# Patient Record
Sex: MALE | Race: WHITE | NOT HISPANIC OR LATINO | Employment: STUDENT | URBAN - METROPOLITAN AREA
[De-identification: names, ages, dates, MRNs, and addresses within clinical notes are randomized per-mention and may not be internally consistent; named-entity substitution may affect disease eponyms.]

---

## 2017-10-31 ENCOUNTER — OFFICE VISIT (OUTPATIENT)
Dept: UROLOGY | Facility: CLINIC | Age: 22
End: 2017-10-31
Payer: COMMERCIAL

## 2017-10-31 VITALS — BODY MASS INDEX: 39.17 KG/M2 | WEIGHT: 315 LBS | HEIGHT: 75 IN

## 2017-10-31 DIAGNOSIS — R10.9 FLANK PAIN: ICD-10-CM

## 2017-10-31 DIAGNOSIS — N50.819 ORCHALGIA: Primary | ICD-10-CM

## 2017-10-31 LAB
BILIRUB UR QL STRIP: NEGATIVE
CLARITY UR REFRACT.AUTO: CLEAR
COLOR UR AUTO: YELLOW
GLUCOSE UR QL STRIP: NEGATIVE
HGB UR QL STRIP: NEGATIVE
KETONES UR QL STRIP: NEGATIVE
LEUKOCYTE ESTERASE UR QL STRIP: NEGATIVE
NITRITE UR QL STRIP: NEGATIVE
PH UR STRIP: 6 [PH] (ref 5–8)
PROT UR QL STRIP: NEGATIVE
SP GR UR STRIP: 1.01 (ref 1–1.03)
URN SPEC COLLECT METH UR: NORMAL
UROBILINOGEN UR STRIP-ACNC: NEGATIVE EU/DL

## 2017-10-31 PROCEDURE — 99203 OFFICE O/P NEW LOW 30 MIN: CPT | Mod: S$GLB,,, | Performed by: NURSE PRACTITIONER

## 2017-10-31 PROCEDURE — 99999 PR PBB SHADOW E&M-NEW PATIENT-LVL IV: CPT | Mod: PBBFAC,,, | Performed by: NURSE PRACTITIONER

## 2017-10-31 PROCEDURE — 87086 URINE CULTURE/COLONY COUNT: CPT

## 2017-10-31 PROCEDURE — 81003 URINALYSIS AUTO W/O SCOPE: CPT

## 2017-10-31 RX ORDER — SULFAMETHOXAZOLE AND TRIMETHOPRIM 800; 160 MG/1; MG/1
1 TABLET ORAL 2 TIMES DAILY
Qty: 28 TABLET | Refills: 0 | Status: SHIPPED | OUTPATIENT
Start: 2017-10-31 | End: 2017-11-14

## 2017-10-31 NOTE — PATIENT INSTRUCTIONS
Sign-up for patient portal.  Urinalysis  Urine culture  Follow-up in 1 month if symptoms worsen or fail to improve.

## 2017-10-31 NOTE — PROGRESS NOTES
Subjective:       Patient ID: Rosa Maria Baltazar is a 22 y.o. male.    Chief Complaint: Other (penile discharge)    22 year old WM with c/o left scrotal pain. No pain today. Worse pain a 6/10 on pain scale to left scrotal/testicle when it occurs.       Pain   This is a new problem. Episode onset: 1 week ago. The problem occurs intermittently. The problem has been gradually improving. Associated symptoms include abdominal pain (LLQ pain) and urinary symptoms (frequency ). Pertinent negatives include no change in bowel habit, chills, fatigue, fever, nausea or vomiting. Nothing aggravates the symptoms. He has tried nothing for the symptoms.     Review of Systems   Constitutional: Negative for chills, fatigue and fever.   Gastrointestinal: Positive for abdominal pain (LLQ pain). Negative for change in bowel habit, constipation, diarrhea, nausea and vomiting.   Endocrine: Negative.    Genitourinary: Positive for discharge (only with straining for BM), frequency and testicular pain. Negative for difficulty urinating, dysuria, flank pain, hematuria, penile pain and scrotal swelling.   Musculoskeletal: Negative.        Objective:      Physical Exam   Constitutional: He is oriented to person, place, and time. He appears well-developed and well-nourished. No distress.   HENT:   Head: Normocephalic and atraumatic.   Eyes: EOM are normal. Pupils are equal, round, and reactive to light.   Neck: Normal range of motion. Neck supple.   Cardiovascular: Normal rate.    Pulmonary/Chest: Effort normal. No respiratory distress.   Abdominal: Soft. Bowel sounds are normal. There is no tenderness. Hernia confirmed negative in the right inguinal area and confirmed negative in the left inguinal area.   Genitourinary: Penis normal. Right testis shows no mass, no swelling and no tenderness. Left testis shows swelling. Left testis shows no mass and no tenderness. Circumcised.   Musculoskeletal: Normal range of motion.   Lymphadenopathy: No  inguinal adenopathy noted on the right or left side.   Neurological: He is alert and oriented to person, place, and time. Coordination normal.   Skin: Skin is warm and dry.   Psychiatric: He has a normal mood and affect. His behavior is normal. Judgment and thought content normal.   Nursing note and vitals reviewed.      Assessment:       1. Orchalgia    2. Flank pain        Plan:       Rosa Maria was seen today for other.    Diagnoses and all orders for this visit:    Orchalgia  -     Urinalysis  -     Urine culture  -     X-Ray Abdomen AP 1 View; Future  -     US Retroperitoneal Complete; Future  -     sulfamethoxazole-trimethoprim 800-160mg (BACTRIM DS) 800-160 mg Tab; Take 1 tablet by mouth 2 (two) times daily.  -     US Scrotum And Testicles; Future    Flank pain    Follow-up in 1 month if symptoms worsen or fail to improve.     Rocky Chapin, NP

## 2017-11-01 LAB — BACTERIA UR CULT: NO GROWTH

## 2018-04-17 ENCOUNTER — TELEPHONE (OUTPATIENT)
Dept: FAMILY MEDICINE | Facility: CLINIC | Age: 23
End: 2018-04-17

## 2018-04-17 RX ORDER — GABAPENTIN 100 MG/1
CAPSULE ORAL
Qty: 90 CAPSULE | Refills: 0 | Status: ON HOLD | OUTPATIENT
Start: 2018-04-17 | End: 2018-11-09 | Stop reason: CLARIF

## 2018-04-17 RX ORDER — TIZANIDINE 2 MG/1
TABLET ORAL
Qty: 25 TABLET | Refills: 0 | Status: ON HOLD | OUTPATIENT
Start: 2018-04-17 | End: 2018-11-09 | Stop reason: CLARIF

## 2018-04-17 RX ORDER — PREDNISONE 20 MG/1
TABLET ORAL
Qty: 10 TABLET | Refills: 0 | Status: SHIPPED | OUTPATIENT
Start: 2018-04-17 | End: 2018-11-08

## 2018-04-17 NOTE — TELEPHONE ENCOUNTER
Called and having more back pain  Hx of multiple disc disease  Having more pain  Had injections multiple times    Will call meds for temporary relief

## 2018-06-28 ENCOUNTER — TELEPHONE (OUTPATIENT)
Dept: GASTROENTEROLOGY | Facility: CLINIC | Age: 23
End: 2018-06-28

## 2018-06-28 NOTE — TELEPHONE ENCOUNTER
----- Message from Edie Mccarty sent at 6/28/2018  9:56 AM CDT -----  Patient's mother, Della, called.   No. [phone]   New Patient has blood in his stool and has abdominal pain.   He needs to be seen this week.   Please call.

## 2018-07-03 ENCOUNTER — LAB VISIT (OUTPATIENT)
Dept: LAB | Facility: HOSPITAL | Age: 23
End: 2018-07-03
Attending: INTERNAL MEDICINE
Payer: COMMERCIAL

## 2018-07-03 ENCOUNTER — OFFICE VISIT (OUTPATIENT)
Dept: GASTROENTEROLOGY | Facility: CLINIC | Age: 23
End: 2018-07-03
Payer: COMMERCIAL

## 2018-07-03 VITALS
BODY MASS INDEX: 39.17 KG/M2 | HEART RATE: 96 BPM | HEIGHT: 75 IN | WEIGHT: 315 LBS | SYSTOLIC BLOOD PRESSURE: 159 MMHG | DIASTOLIC BLOOD PRESSURE: 88 MMHG

## 2018-07-03 DIAGNOSIS — R10.32 LLQ PAIN: ICD-10-CM

## 2018-07-03 DIAGNOSIS — R10.32 LLQ PAIN: Primary | ICD-10-CM

## 2018-07-03 DIAGNOSIS — K62.5 RECTAL BLEEDING: ICD-10-CM

## 2018-07-03 LAB
ALBUMIN SERPL BCP-MCNC: 4.5 G/DL
ALP SERPL-CCNC: 78 U/L
ALT SERPL W/O P-5'-P-CCNC: 59 U/L
ANION GAP SERPL CALC-SCNC: 9 MMOL/L
AST SERPL-CCNC: 33 U/L
BASOPHILS # BLD AUTO: 0.02 K/UL
BASOPHILS NFR BLD: 0.1 %
BILIRUB SERPL-MCNC: 1 MG/DL
BUN SERPL-MCNC: 15 MG/DL
CALCIUM SERPL-MCNC: 10.1 MG/DL
CHLORIDE SERPL-SCNC: 102 MMOL/L
CO2 SERPL-SCNC: 25 MMOL/L
CREAT SERPL-MCNC: 0.8 MG/DL
DIFFERENTIAL METHOD: ABNORMAL
EOSINOPHIL # BLD AUTO: 0.1 K/UL
EOSINOPHIL NFR BLD: 0.6 %
ERYTHROCYTE [DISTWIDTH] IN BLOOD BY AUTOMATED COUNT: 12.6 %
EST. GFR  (AFRICAN AMERICAN): >60 ML/MIN/1.73 M^2
EST. GFR  (NON AFRICAN AMERICAN): >60 ML/MIN/1.73 M^2
GLUCOSE SERPL-MCNC: 96 MG/DL
HCT VFR BLD AUTO: 49.8 %
HGB BLD-MCNC: 16.8 G/DL
LYMPHOCYTES # BLD AUTO: 3.2 K/UL
LYMPHOCYTES NFR BLD: 22.3 %
MCH RBC QN AUTO: 27.3 PG
MCHC RBC AUTO-ENTMCNC: 33.7 G/DL
MCV RBC AUTO: 81 FL
MONOCYTES # BLD AUTO: 1.3 K/UL
MONOCYTES NFR BLD: 9.4 %
NEUTROPHILS # BLD AUTO: 9.6 K/UL
NEUTROPHILS NFR BLD: 67.2 %
PLATELET # BLD AUTO: 277 K/UL
PMV BLD AUTO: 10.2 FL
POTASSIUM SERPL-SCNC: 3.6 MMOL/L
PROT SERPL-MCNC: 7.9 G/DL
RBC # BLD AUTO: 6.16 M/UL
SODIUM SERPL-SCNC: 136 MMOL/L
WBC # BLD AUTO: 14.27 K/UL

## 2018-07-03 PROCEDURE — 80053 COMPREHEN METABOLIC PANEL: CPT

## 2018-07-03 PROCEDURE — 36415 COLL VENOUS BLD VENIPUNCTURE: CPT

## 2018-07-03 PROCEDURE — 99204 OFFICE O/P NEW MOD 45 MIN: CPT | Mod: S$GLB,,, | Performed by: INTERNAL MEDICINE

## 2018-07-03 PROCEDURE — 85025 COMPLETE CBC W/AUTO DIFF WBC: CPT

## 2018-07-03 PROCEDURE — 99999 PR PBB SHADOW E&M-EST. PATIENT-LVL III: CPT | Mod: PBBFAC,,, | Performed by: INTERNAL MEDICINE

## 2018-07-03 RX ORDER — POLYETHYLENE GLYCOL 3350 17 G/17G
17 POWDER, FOR SOLUTION ORAL DAILY
Qty: 1 BOTTLE | Refills: 11 | Status: ON HOLD | OUTPATIENT
Start: 2018-07-03 | End: 2018-11-09 | Stop reason: CLARIF

## 2018-07-03 RX ORDER — DICYCLOMINE HYDROCHLORIDE 20 MG/1
20 TABLET ORAL 3 TIMES DAILY PRN
Qty: 60 TABLET | Refills: 2 | Status: ON HOLD | OUTPATIENT
Start: 2018-07-03 | End: 2018-11-09 | Stop reason: CLARIF

## 2018-07-03 NOTE — PROGRESS NOTES
"Subjective:       Patient ID: Rosa Maria Baltazar is a 22 y.o. male.    Chief Complaint: Rectal Bleeding (3 weeks ) and Abdominal Pain (3 weeks )    21 yo M complains of 3 week h/o LLQ pain and rectal bleeding.  Pt had rectal bleeding in college and had EGD/cscope at [place], and states he was dx with hemorrhoids and PUD.  He states that the LLQ pain is mild and intermittent and described more as a "nagging pain."  He continues to have his 3 BM per day without change in consistency or frequency of BM.  He denies F/C, N/V, dysuria, hematuria, or scrotal/testicular issue.  He states he has always had issues with straining for BM.      Review of Systems   Constitutional: Negative for appetite change and unexpected weight change.   Eyes: Negative for photophobia and visual disturbance.   Respiratory: Negative for chest tightness, shortness of breath and wheezing.    Cardiovascular: Negative for chest pain, palpitations and leg swelling.   Genitourinary: Negative for dysuria, flank pain and hematuria.   Musculoskeletal: Negative for joint swelling and myalgias.   Skin: Negative for color change and rash.   Neurological: Negative for dizziness and speech difficulty.   Psychiatric/Behavioral: Negative for confusion and hallucinations.       Objective:      Physical Exam   Constitutional: He is oriented to person, place, and time. He appears well-developed and well-nourished.   HENT:   Head: Normocephalic and atraumatic.   Eyes: EOM are normal. Pupils are equal, round, and reactive to light.   Neck: Normal range of motion. Neck supple.   Cardiovascular: Normal rate, regular rhythm, normal heart sounds and intact distal pulses.    Pulmonary/Chest: Effort normal and breath sounds normal.   Abdominal: Soft. Bowel sounds are normal. He exhibits no distension and no mass. There is no tenderness. There is no rebound and no guarding.   Musculoskeletal: He exhibits no edema or deformity.   Neurological: He is alert and oriented to " person, place, and time.   Skin: Skin is warm and dry.   Psychiatric: He has a normal mood and affect. His behavior is normal.       Assessment:       1. LLQ pain    2. Rectal bleeding        Plan:       LLQ pain; Rectal bleeding  -     CBC auto differential; Future; Expected date: 07/03/2018  -     Comprehensive metabolic panel; Future; Expected date: 07/03/2018  -     dicyclomine (BENTYL) 20 mg tablet; Take 1 tablet (20 mg total) by mouth 3 (three) times daily as needed.  Dispense: 60 tablet; Refill: 2  -     polyethylene glycol (GLYCOLAX) 17 gram/dose powder; Take 17 g by mouth once daily.  Dispense: 1 Bottle; Refill: 11  -     MRI Abdomen-Pelvis w w/o Contrast (XPD); Future; Expected date: 07/03/2018.  MRI instead of CT scan due to shellfish allergy  -     I am suspicious that rectal bleeding is hemorrhoidal in nature given previous colonoscopy.  Obtain records from previous EGD/cscope at [place].

## 2018-07-05 ENCOUNTER — TELEPHONE (OUTPATIENT)
Dept: GASTROENTEROLOGY | Facility: CLINIC | Age: 23
End: 2018-07-05

## 2018-07-05 NOTE — TELEPHONE ENCOUNTER
----- Message from Hortencia Oliver MD sent at 7/5/2018 11:22 AM CDT -----  I don't need the enterography, just the MRI pelvis c/s.     ----- Message -----  From: Yamilet Pineda MA  Sent: 7/5/2018  11:16 AM  To: Hortencia Oliver MD    Please advise  ----- Message -----  From: RT Cinthia  Sent: 7/5/2018  10:51 AM  To: Benito BRANTLEY Staff ([place])    Hi...this pt is on our MRI schedule in [place].  I was wondering if the order was suppose to be for an MR enterography or did you really want abd pelvis...Enterography envolves the pt drinking contrast and receiving iv contrast where the other is only iv contrast..both will image the bowel.  Please advise and place new order if necessary    Thanks  RT Marc()

## 2018-07-10 ENCOUNTER — HOSPITAL ENCOUNTER (OUTPATIENT)
Dept: RADIOLOGY | Facility: HOSPITAL | Age: 23
Discharge: HOME | End: 2018-07-10
Attending: INTERNAL MEDICINE
Payer: COMMERCIAL

## 2018-07-10 DIAGNOSIS — K62.5 RECTAL BLEEDING: ICD-10-CM

## 2018-07-10 DIAGNOSIS — R10.32 LLQ PAIN: ICD-10-CM

## 2018-07-10 PROCEDURE — A9585 GADOBUTROL INJECTION: HCPCS | Performed by: INTERNAL MEDICINE

## 2018-07-10 PROCEDURE — 72197 MRI PELVIS W/O & W/DYE: CPT | Mod: 26,,, | Performed by: RADIOLOGY

## 2018-07-10 PROCEDURE — 25500020 PHARM REV CODE 255: Performed by: INTERNAL MEDICINE

## 2018-07-10 PROCEDURE — 72197 MRI PELVIS W/O & W/DYE: CPT | Mod: TC

## 2018-07-10 PROCEDURE — 74183 MRI ABD W/O CNTR FLWD CNTR: CPT | Mod: 26,,, | Performed by: RADIOLOGY

## 2018-07-10 RX ORDER — GADOBUTROL 604.72 MG/ML
10 INJECTION INTRAVENOUS
Status: COMPLETED | OUTPATIENT
Start: 2018-07-10 | End: 2018-07-10

## 2018-07-10 RX ADMIN — GADOBUTROL 10 ML: 604.72 INJECTION INTRAVENOUS at 02:07

## 2018-07-16 ENCOUNTER — TELEPHONE (OUTPATIENT)
Dept: GASTROENTEROLOGY | Facility: CLINIC | Age: 23
End: 2018-07-16

## 2018-07-16 NOTE — TELEPHONE ENCOUNTER
----- Message from Hortencia Oliver MD sent at 7/16/2018  7:48 AM CDT -----  MRI does not show diverticulitis, but WBC were elevated.  Is he still having the pain?  Is it improving or worsening?

## 2018-07-17 ENCOUNTER — TELEPHONE (OUTPATIENT)
Dept: GASTROENTEROLOGY | Facility: CLINIC | Age: 23
End: 2018-07-17

## 2018-07-17 NOTE — TELEPHONE ENCOUNTER
Informed pt of MRI results and WBC labs. Pt stated that his stomach is still bothering him. The dicyclomine is helping a little but not much.       (Medical records received and scanned into media)

## 2018-07-17 NOTE — TELEPHONE ENCOUNTER
----- Message from Masoud Schafer sent at 7/17/2018 10:17 AM CDT -----  Contact: self/[phone]  Patient is returning your call.  Please advise

## 2018-07-18 ENCOUNTER — TELEPHONE (OUTPATIENT)
Dept: GASTROENTEROLOGY | Facility: HOSPITAL | Age: 23
End: 2018-07-18

## 2018-07-18 DIAGNOSIS — R10.32 LLQ PAIN: Primary | ICD-10-CM

## 2018-07-18 RX ORDER — METRONIDAZOLE 500 MG/1
500 TABLET ORAL EVERY 8 HOURS
Qty: 42 TABLET | Refills: 0 | Status: SHIPPED | OUTPATIENT
Start: 2018-07-18 | End: 2018-08-01

## 2018-07-18 RX ORDER — CIPROFLOXACIN 500 MG/1
500 TABLET ORAL EVERY 12 HOURS
Qty: 28 TABLET | Refills: 0 | Status: SHIPPED | OUTPATIENT
Start: 2018-07-18 | End: 2018-08-01

## 2018-07-18 NOTE — TELEPHONE ENCOUNTER
WBC elevated but no diverticulitis on MRI.  It still could be diverticulitis as MRI is not best test (done due to allergy) therefore will do empiric treatment for diverticulitis with cipro/flagyl.  Meds sent via [facility].  Please let pt know.  If pt still with pain 2 weeks after completing ALL of the abx, he will need to come back to clinic.  Ok to continue the bentyl.

## 2018-11-07 ENCOUNTER — OFFICE VISIT (OUTPATIENT)
Dept: GASTROENTEROLOGY | Facility: CLINIC | Age: 23
End: 2018-11-07
Payer: COMMERCIAL

## 2018-11-07 VITALS
DIASTOLIC BLOOD PRESSURE: 101 MMHG | BODY MASS INDEX: 39.17 KG/M2 | HEIGHT: 75 IN | WEIGHT: 315 LBS | SYSTOLIC BLOOD PRESSURE: 151 MMHG

## 2018-11-07 DIAGNOSIS — R19.4 CHANGE IN BOWEL HABIT: ICD-10-CM

## 2018-11-07 DIAGNOSIS — R10.31 ABDOMINAL PAIN, RLQ (RIGHT LOWER QUADRANT): ICD-10-CM

## 2018-11-07 DIAGNOSIS — R10.32 LLQ PAIN: ICD-10-CM

## 2018-11-07 DIAGNOSIS — K62.5 RECTAL BLEEDING: Primary | ICD-10-CM

## 2018-11-07 PROCEDURE — 99214 OFFICE O/P EST MOD 30 MIN: CPT | Mod: S$GLB,,, | Performed by: NURSE PRACTITIONER

## 2018-11-07 PROCEDURE — 3008F BODY MASS INDEX DOCD: CPT | Mod: CPTII,S$GLB,, | Performed by: NURSE PRACTITIONER

## 2018-11-07 PROCEDURE — 99999 PR PBB SHADOW E&M-EST. PATIENT-LVL III: CPT | Mod: PBBFAC,,, | Performed by: NURSE PRACTITIONER

## 2018-11-07 RX ORDER — OMEPRAZOLE 20 MG/1
20 TABLET, DELAYED RELEASE ORAL DAILY
COMMUNITY
End: 2019-11-15

## 2018-11-07 NOTE — PROGRESS NOTES
Subjective:       Patient ID: Rosa Maria Baltazar is a 23 y.o. male.    Chief Complaint: Abdominal Pain and Rectal Bleeding    HPI  Reports LLQ abdominal pain and rectal bleeding beginning in July.  Evaluation at that time with negative MRI and elevated WBC and concern for diverticulitis based on clinical findings.  Antibiotics were prescribed but he did not complete.  He notes that he had continued to have episodic lower abdominal pain that is worse in the LLQ but can also be present in RLQ.  He notes rectal bleeding.  Describes change in bowel habit over the last month with both liquid stools or formed stools that are soft and very narrow.  He reports straining for bowel movements even with soft or small stools.  Denies anal rectal pain.   He had colonoscopy in 2014 with moderate internal hemorrhoids and fissure noted ( in media, performed by Dr. Garcia.  NO biopsies at that time).  He has been given bentyl at some time in the past but does not recall this medication.      Review of Systems   Constitutional: Negative for activity change, appetite change, fatigue and fever.   Respiratory: Negative for shortness of breath.    Cardiovascular: Negative for chest pain.   Gastrointestinal: Positive for abdominal pain, anal bleeding, blood in stool, constipation and diarrhea. Negative for nausea, rectal pain and vomiting.   Skin: Negative.    Neurological: Negative.    Psychiatric/Behavioral: Negative.        Objective:      Physical Exam   Constitutional: He is oriented to person, place, and time. He appears well-developed and well-nourished. No distress.   Eyes: No scleral icterus.   Cardiovascular: Normal rate.   Pulmonary/Chest: Effort normal. No respiratory distress.   Abdominal: Soft. Bowel sounds are normal. He exhibits no distension and no mass. There is no tenderness. There is no guarding.   Genitourinary:   Genitourinary Comments: CARRIE with no palpable mass or other abnormality.  CARRIE difficult due to body habitus.   No blood noted.   Neurological: He is alert and oriented to person, place, and time.   Skin: Skin is warm and dry. He is not diaphoretic.   Psychiatric: He has a normal mood and affect. His behavior is normal. Judgment and thought content normal.   Vitals reviewed.      Assessment:       1. Rectal bleeding    2. LLQ pain    3. Abdominal pain, RLQ (right lower quadrant)    4. Change in bowel habit        Plan:         Rosa Maria was seen today for abdominal pain and rectal bleeding.    Diagnoses and all orders for this visit:    Rectal bleeding  -     CBC auto differential; Future    LLQ pain    Abdominal pain, RLQ (right lower quadrant)    Change in bowel habit    -   High fiber diet  -   Fiber supplement  -   [place] fluid intake   -   Exercise    Will schedule colonoscopy due to painless rectal bleeding, change in bowel habits and abdominal pain, though I suspect symptoms may be IBS.  Will consider further treatment after other pathology is ruled out.

## 2018-11-07 NOTE — PATIENT INSTRUCTIONS
CLENPIQ Instructions    You are scheduled for a colonoscopy with Dr. Oliver on 11-9-18 at [facility]  To ensure that your test is accurate and complete, you MUST follow these instructions listed below.  If you have any questions, please call our office at [phone].  Plan on being at the hospital for your procedure for 3-4 hours.    1.  Follow a CLEAR LIQUID DIET for the entire day before your scheduled colonoscopy.  This means no solid food the entire day starting when you wake.  You may have as much of the clear liquids as you want throughout the day.   CLEAR LIQUID DIET:   - Avoid Red, Orange, Purple, and/or Blue food coloring   - NO DAIRY   - You can have:  Coffee with sugar (no creamer), tea, water, soda, apple or white grape juice, chicken or beef broth/bouillon (no meat, noodles, or veggies), green/yellow popsicles, green/yellow Jell-O, lemonade.    2.  AT 5 pm the evening before your colonoscopy, OPEN ONE (1) BOTTLE OF CLENPIQ AND DRINK THE ENTIRE BOTTLE.  DRINK FIVE (5) 8-OUNCE GLASSES OF WATER (40 OUNCES TOTAL) OVER THE NEXT FIVE (5) HOURS.     3.  The endoscopy department will call you 2 days before your colonoscopy to tell you the exact time to arrive, AND to tell you the exact time to drink the 2nd portion of your prep (which will be FIVE HOURS BEFORE YOUR ARRIVAL TIME).  At this time given to you, OPEN THE OTHER ONE (1) BOTTLE OF CLENPIQ AND DRINK THE ENTIRE BOTTLE.  DRINK THREE (3) 8-OUNCE GLASSES OF WATER (24 OUNCES TOTAL) OVER THE NEXT THREE (3) HOURS. Once this is complete, you can not have anything else by mouth!    4.  You must have someone with you to DRIVE YOU HOME since you will be receiving IV sedation for the colonoscopy.    5.  It is ok to take your heart, blood pressure, and seizure medications in the morning of your test with a SIP of water.  Hold other medications until after your procedure.  Do NOT have anything else to eat or drink the morning of your colonoscopy.   It is ok to brush your teeth.    6.  If you are on blood thinners THAT YOU HAVE BEEN INSTRUCTED TO HOLD BY YOUR DOCTOR FOR THIS PROCEDURE, then do NOT take this the morning of your colonoscopy.  Do NOT stop these medications on your own, they must be approved to be held by your doctor.  Your colonoscopy can NOT be done if you are on these medications.  Examples of blood thinners include: Coumadin, Aggrenox, Plavix, Pradaxa, Reapro, Pletal, Xarelto, Ticagrelor, Brilinta, Eliquis, and high dose aspirin (325 mg).  You do not have to stop baby aspirin 81 mg.    7.  IF YOU ARE DIABETIC:  NO INSULIN OR ORAL MEDICATIONS THE MORNING OF THE COLONOSCOPY.  TAKE ONLY HALF THE DOSE OF YOUR INSULIN THE DAY BEFORE THE COLONOSCOPY.  DO NOT TAKE ANY ORAL DIABETIC MEDICATIONS THE DAY BEFORE THE COLONOSCOPY.  IF YOU ARE AN INSULIN DEPENDENT DIABETIC WITH UNSTABLE BLOOD SUGARDS, NOTIFY YOUR PRIMARY CARE PHYSICIAN FOR INSTRUCTIONS.

## 2018-11-08 ENCOUNTER — TELEPHONE (OUTPATIENT)
Dept: GASTROENTEROLOGY | Facility: CLINIC | Age: 23
End: 2018-11-08

## 2018-11-08 DIAGNOSIS — D72.829 LEUKOCYTOSIS, UNSPECIFIED TYPE: Primary | ICD-10-CM

## 2018-11-08 NOTE — TELEPHONE ENCOUNTER
----- Message from CATRACHO Valdez sent at 11/8/2018 12:35 PM CST -----  Let him know that his white blood cell count remains high as it has been over the last few years and we can make him an appt with hematology to evaluate.  I have placed referral, can schedule with Dr. Leija Or Dr. Degroot in [place]

## 2018-11-08 NOTE — TELEPHONE ENCOUNTER
Informed pt of lab results, and that someone will get in contact with him from the Hematology department to schedule an appt. Vertbal Understanding.

## 2018-11-12 ENCOUNTER — OFFICE VISIT (OUTPATIENT)
Dept: HEMATOLOGY/ONCOLOGY | Facility: CLINIC | Age: 23
End: 2018-11-12
Payer: COMMERCIAL

## 2018-11-12 VITALS
DIASTOLIC BLOOD PRESSURE: 110 MMHG | BODY MASS INDEX: 46.95 KG/M2 | SYSTOLIC BLOOD PRESSURE: 170 MMHG | TEMPERATURE: 97 F | WEIGHT: 315 LBS | HEART RATE: 96 BPM | RESPIRATION RATE: 16 BRPM | OXYGEN SATURATION: 97 %

## 2018-11-12 DIAGNOSIS — K62.5 RECTAL BLEEDING: Primary | ICD-10-CM

## 2018-11-12 DIAGNOSIS — D72.823 LEUKEMOID REACTION: ICD-10-CM

## 2018-11-12 PROBLEM — K58.8 OTHER IRRITABLE BOWEL SYNDROME: Status: ACTIVE | Noted: 2018-11-07

## 2018-11-12 PROCEDURE — 99203 OFFICE O/P NEW LOW 30 MIN: CPT | Mod: S$GLB,,, | Performed by: INTERNAL MEDICINE

## 2018-11-12 PROCEDURE — 99999 PR PBB SHADOW E&M-EST. PATIENT-LVL III: CPT | Mod: PBBFAC,,, | Performed by: INTERNAL MEDICINE

## 2018-11-12 PROCEDURE — 3008F BODY MASS INDEX DOCD: CPT | Mod: CPTII,S$GLB,, | Performed by: INTERNAL MEDICINE

## 2018-11-12 RX ORDER — POLYETHYLENE GLYCOL 3350 17 G/17G
POWDER, FOR SOLUTION ORAL
COMMUNITY
End: 2019-11-15

## 2018-11-12 NOTE — PROGRESS NOTES
Subjective:       Patient ID: Rosa Maria Baltazar is a 23 y.o. male.    Chief Complaint: abnormal labs (wbc elevated)    HPI Referred for evaluation of leucocytosis.    CBC 11/8/18 - showed WBC count of 13.99. Differential count unremarkable. Hb and Platelet count WNL.    He was recently diagnosed with IBS. Has colonic spasm on recent colonoscopy.    Past CBC's reviewed.    He had elevated WBC on a prior CBC in 2016, then normalized in 2017 now mildly elevated again.    Weight stable. Appetite good.    Pt accompanied by his mother.    Review of Systems   Constitutional: Negative for fever and unexpected weight change.   HENT: Negative for mouth sores and nosebleeds.    Eyes: Negative for photophobia and pain.   Respiratory: Negative for shortness of breath and wheezing.    Cardiovascular: Negative for chest pain and palpitations.   Gastrointestinal: Negative for abdominal pain and vomiting.   Musculoskeletal: Negative for neck pain and neck stiffness.   Skin: Negative for rash and wound.   Neurological: Negative for seizures and syncope.   Hematological: Negative for adenopathy. Does not bruise/bleed easily.   Psychiatric/Behavioral: Negative for behavioral problems and confusion.         Objective:      Physical Exam   Constitutional: He is oriented to person, place, and time.  Non-toxic appearance. He does not have a sickly appearance. No distress.   HENT:   Nose: No epistaxis.   Mouth/Throat: Oropharynx is clear and moist. Mucous membranes are not pale, not dry and not cyanotic. No lacerations. No oropharyngeal exudate.   Eyes: Conjunctivae are normal. No scleral icterus.   Neck: Neck supple. No thyroid mass and no thyromegaly present.   Cardiovascular: Normal rate, regular rhythm, S1 normal and normal heart sounds.   Pulmonary/Chest: Effort normal and breath sounds normal. No accessory muscle usage or stridor. No respiratory distress. He has no wheezes. He has no rales.   Abdominal: Soft. He exhibits no ascites and  no mass. There is no hepatosplenomegaly. There is no tenderness.   Musculoskeletal: He exhibits no edema.   Lymphadenopathy:        Head (right side): No submental and no submandibular adenopathy present.        Head (left side): No submental and no submandibular adenopathy present.     He has no cervical adenopathy.     He has no axillary adenopathy.   Neurological: He is alert and oriented to person, place, and time. He has normal strength. No cranial nerve deficit or sensory deficit. Gait normal.   Skin: No bruising, no petechiae and no rash noted. He is not diaphoretic. No cyanosis.   Psychiatric: He has a normal mood and affect. Judgment and thought content normal. Cognition and memory are normal.   Vitals reviewed.        Assessment:       1. Rectal bleeding    2. Leukemoid reaction        Plan:   Based on review on recent CBC with differential, past CBC's and his medical history, his elevated WBC is secondary to a leukemoid reaction from IBS.    Discussed this aspect with pt and his mother.    Provided them with recent and past CBC values.    No further work-up needed. Reassured them.    RTC prn.    Many thanks to Sanjuana Agudelo for the kind referral.

## 2018-11-12 NOTE — LETTER
November 12, 2018      Sanjuana Agudelo, [facility]  [address]  [place] [address]           [facility]  [address] [place] [address]  Phone: [phone]          Patient: Rosa Maria Baltazar   MR Number: [id number]   YOB: 1995   Date of Visit: 11/12/2018       Dear Sanjuana Agudelo:    Thank you for referring Rosa Maria Baltazar to me for evaluation. Attached you will find relevant portions of my assessment and plan of care.    If you have questions, please do not hesitate to call me. I look forward to following Rosa Maria Baltazar along with you.    Sincerely,    Henrry Leija MD    Enclosure  CC:  No Recipients    If you would like to receive this communication electronically, please contact [email or web address] or [phone] to request more information on [employer] access.    For providers and/or their staff who would like to refer a patient to [facility], please contact us through our one-stop-shop provider referral line, Roberth Galleog, at [phone].    If you feel you have received this communication in error or would no longer like to receive these types of communications, please e-mail [email or web address]

## 2018-11-16 ENCOUNTER — TELEPHONE (OUTPATIENT)
Dept: GASTROENTEROLOGY | Facility: CLINIC | Age: 23
End: 2018-11-16

## 2018-11-16 NOTE — TELEPHONE ENCOUNTER
----- Message from Michelle Frances sent at 11/16/2018  2:55 PM CST -----  Contact: self / [phone]  Patient is returning a call from your office. For test results .Please advise

## 2019-04-30 DIAGNOSIS — I10 HTN (HYPERTENSION): Primary | ICD-10-CM

## 2019-04-30 RX ORDER — TRAMADOL HYDROCHLORIDE 50 MG/1
50 TABLET ORAL 4 TIMES DAILY
Qty: 60 TABLET | Refills: 0 | OUTPATIENT
Start: 2019-04-30 | End: 2019-11-15

## 2019-10-18 ENCOUNTER — TELEPHONE (OUTPATIENT)
Dept: GASTROENTEROLOGY | Facility: CLINIC | Age: 24
End: 2019-10-18

## 2019-10-18 NOTE — TELEPHONE ENCOUNTER
----- Message from Osman Feliciano sent at 10/18/2019 11:09 AM CDT -----  Contact: [phone]/ self   Patient requesting to speak with you regarding personal matter. Please call.

## 2019-10-18 NOTE — TELEPHONE ENCOUNTER
Patient states he had blood in his stool last night and again this morning. Patient is not having any pain. Colonoscopy done 11/9/18. Patient had a polyp in the rectum that was removed. Do you want to repeat colonoscopy or have patient return to office?

## 2019-11-15 ENCOUNTER — OFFICE VISIT (OUTPATIENT)
Dept: FAMILY MEDICINE | Facility: CLINIC | Age: 24
End: 2019-11-15
Payer: COMMERCIAL

## 2019-11-15 VITALS
HEIGHT: 75 IN | OXYGEN SATURATION: 98 % | HEART RATE: 83 BPM | SYSTOLIC BLOOD PRESSURE: 152 MMHG | WEIGHT: 315 LBS | BODY MASS INDEX: 39.17 KG/M2 | DIASTOLIC BLOOD PRESSURE: 94 MMHG | TEMPERATURE: 98 F

## 2019-11-15 DIAGNOSIS — L03.113 CELLULITIS OF RIGHT UPPER EXTREMITY: ICD-10-CM

## 2019-11-15 DIAGNOSIS — T63.301A SPIDER BITE WOUND, ACCIDENTAL OR UNINTENTIONAL, INITIAL ENCOUNTER: ICD-10-CM

## 2019-11-15 DIAGNOSIS — I10 HYPERTENSION, ESSENTIAL: ICD-10-CM

## 2019-11-15 DIAGNOSIS — M25.521 RIGHT ELBOW PAIN: Primary | ICD-10-CM

## 2019-11-15 DIAGNOSIS — K21.00 GASTRO-ESOPHAGEAL REFLUX DISEASE WITH ESOPHAGITIS: ICD-10-CM

## 2019-11-15 PROCEDURE — 3008F BODY MASS INDEX DOCD: CPT | Mod: CPTII,S$GLB,, | Performed by: INTERNAL MEDICINE

## 2019-11-15 PROCEDURE — 99999 PR PBB SHADOW E&M-EST. PATIENT-LVL IV: ICD-10-PCS | Mod: PBBFAC,,, | Performed by: INTERNAL MEDICINE

## 2019-11-15 PROCEDURE — 3077F PR MOST RECENT SYSTOLIC BLOOD PRESSURE >= 140 MM HG: ICD-10-PCS | Mod: CPTII,S$GLB,, | Performed by: INTERNAL MEDICINE

## 2019-11-15 PROCEDURE — 96372 PR INJECTION,THERAP/PROPH/DIAG2ST, IM OR SUBCUT: ICD-10-PCS | Mod: S$GLB,,, | Performed by: INTERNAL MEDICINE

## 2019-11-15 PROCEDURE — 3080F DIAST BP >= 90 MM HG: CPT | Mod: CPTII,S$GLB,, | Performed by: INTERNAL MEDICINE

## 2019-11-15 PROCEDURE — 3077F SYST BP >= 140 MM HG: CPT | Mod: CPTII,S$GLB,, | Performed by: INTERNAL MEDICINE

## 2019-11-15 PROCEDURE — 99214 OFFICE O/P EST MOD 30 MIN: CPT | Mod: 25,S$GLB,, | Performed by: INTERNAL MEDICINE

## 2019-11-15 PROCEDURE — 99999 PR PBB SHADOW E&M-EST. PATIENT-LVL IV: CPT | Mod: PBBFAC,,, | Performed by: INTERNAL MEDICINE

## 2019-11-15 PROCEDURE — 3080F PR MOST RECENT DIASTOLIC BLOOD PRESSURE >= 90 MM HG: ICD-10-PCS | Mod: CPTII,S$GLB,, | Performed by: INTERNAL MEDICINE

## 2019-11-15 PROCEDURE — 96372 THER/PROPH/DIAG INJ SC/IM: CPT | Mod: S$GLB,,, | Performed by: INTERNAL MEDICINE

## 2019-11-15 PROCEDURE — 3008F PR BODY MASS INDEX (BMI) DOCUMENTED: ICD-10-PCS | Mod: CPTII,S$GLB,, | Performed by: INTERNAL MEDICINE

## 2019-11-15 PROCEDURE — 99214 PR OFFICE/OUTPT VISIT, EST, LEVL IV, 30-39 MIN: ICD-10-PCS | Mod: 25,S$GLB,, | Performed by: INTERNAL MEDICINE

## 2019-11-15 RX ORDER — TRIAMCINOLONE ACETONIDE 40 MG/ML
40 INJECTION, SUSPENSION INTRA-ARTICULAR; INTRAMUSCULAR
Status: COMPLETED | OUTPATIENT
Start: 2019-11-15 | End: 2019-11-15

## 2019-11-15 RX ORDER — GABAPENTIN 300 MG/1
CAPSULE ORAL
Qty: 30 CAPSULE | Refills: 2 | Status: CANCELLED | OUTPATIENT
Start: 2019-11-15

## 2019-11-15 RX ORDER — CHLORTHALIDONE 25 MG/1
25 TABLET ORAL DAILY
Qty: 90 TABLET | Refills: 0 | Status: CANCELLED | OUTPATIENT
Start: 2019-11-15

## 2019-11-15 RX ORDER — DOXYCYCLINE HYCLATE 100 MG
100 TABLET ORAL 2 TIMES DAILY
Qty: 20 TABLET | Refills: 0 | Status: ON HOLD | OUTPATIENT
Start: 2019-11-15 | End: 2019-11-20 | Stop reason: HOSPADM

## 2019-11-15 RX ORDER — LOSARTAN POTASSIUM 50 MG/1
50 TABLET ORAL 2 TIMES DAILY
Qty: 30 TABLET | Refills: 0 | Status: CANCELLED | OUTPATIENT
Start: 2019-11-15

## 2019-11-15 RX ORDER — PANTOPRAZOLE SODIUM 40 MG/1
TABLET, DELAYED RELEASE ORAL
Qty: 30 TABLET | Refills: 2 | Status: CANCELLED | OUTPATIENT
Start: 2019-11-15

## 2019-11-15 RX ADMIN — TRIAMCINOLONE ACETONIDE 40 MG: 40 INJECTION, SUSPENSION INTRA-ARTICULAR; INTRAMUSCULAR at 10:11

## 2019-11-15 NOTE — PATIENT INSTRUCTIONS
We have reviewed your prescription medications.   It looks like you had a spider bite. We will treat with a steroid shot and an antibiotic. You have been given a steroid shot to help manage your symptoms. Possible side effects of this medication are sleeplessness, irritability, and increased hunger.     We will excuse from work today. Follow-up with pcp in 2 weeks if not resolved.

## 2019-11-15 NOTE — LETTER
November 15, 2019      [place] [id number], [place] [id number]  [facility] [address]  Phone: [phone]  Fax: [phone]       Patient: Rosa Maria Baltazar   YOB: 1995  Date of Visit: 11/15/2019    To Whom It May Concern:    Baldemar Baltazar  was at [facility] on 11/15/2019. He may return to work/school on November 18th, 2019 with no restrictions. If you have any questions or concerns, or if I can be of further assistance, please do not hesitate to contact me.    Sincerely,        SERGIO Galvan LPN

## 2019-11-18 PROBLEM — L02.91 ABSCESS: Status: ACTIVE | Noted: 2019-11-18

## 2019-11-18 PROBLEM — L03.90 CELLULITIS: Status: ACTIVE | Noted: 2019-11-18

## 2019-11-19 PROBLEM — E66.01 CLASS 3 SEVERE OBESITY WITH BODY MASS INDEX (BMI) OF 45.0 TO 49.9 IN ADULT: Status: ACTIVE | Noted: 2019-11-19

## 2019-11-20 DIAGNOSIS — K21.9 GASTROESOPHAGEAL REFLUX DISEASE, ESOPHAGITIS PRESENCE NOT SPECIFIED: Primary | ICD-10-CM

## 2019-11-20 DIAGNOSIS — R10.32 LLQ PAIN: ICD-10-CM

## 2019-11-20 RX ORDER — DICYCLOMINE HYDROCHLORIDE 20 MG/1
20 TABLET ORAL 3 TIMES DAILY PRN
Qty: 60 TABLET | Refills: 2 | Status: CANCELLED | OUTPATIENT
Start: 2019-11-20

## 2019-11-21 RX ORDER — DICYCLOMINE HYDROCHLORIDE 20 MG/1
20 TABLET ORAL 3 TIMES DAILY PRN
Qty: 60 TABLET | Refills: 1 | Status: SHIPPED | OUTPATIENT
Start: 2019-11-21

## 2019-11-21 RX ORDER — PANTOPRAZOLE SODIUM 40 MG/1
40 TABLET, DELAYED RELEASE ORAL DAILY
Qty: 90 TABLET | Refills: 0 | Status: SHIPPED | OUTPATIENT
Start: 2019-11-21 | End: 2020-11-20

## 2019-11-21 NOTE — TELEPHONE ENCOUNTER
Pt no-showed a visit yesterday and has not been seen in 1 year, nor has he been seen for reflux. 2 month supply of meds given, please schedule another appt for him.

## 2020-01-22 RX ORDER — AMLODIPINE BESYLATE 10 MG/1
10 TABLET ORAL DAILY
Qty: 90 TABLET | Refills: 0 | Status: CANCELLED | OUTPATIENT
Start: 2020-01-22

## 2020-03-02 ENCOUNTER — TELEPHONE (OUTPATIENT)
Dept: GASTROENTEROLOGY | Facility: CLINIC | Age: 25
End: 2020-03-02

## 2020-03-02 DIAGNOSIS — K21.9 GASTROESOPHAGEAL REFLUX DISEASE, ESOPHAGITIS PRESENCE NOT SPECIFIED: ICD-10-CM

## 2020-03-02 RX ORDER — PANTOPRAZOLE SODIUM 40 MG/1
40 TABLET, DELAYED RELEASE ORAL DAILY
Qty: 90 TABLET | Refills: 0 | OUTPATIENT
Start: 2020-03-02 | End: 2021-03-02

## 2020-03-02 NOTE — TELEPHONE ENCOUNTER
Patient's last office visit was 11/7/18. Patient no showed for a visit on 11/20/19. Wants a refill of pantoprazole.

## 2020-03-02 NOTE — TELEPHONE ENCOUNTER
Per Dr. Oliver, told patient that his prescription was denied and he needs an appointment. Patient states that he thought his prescription was from Dr. Espinoza. Told patient the message came to our office because the last refill was from Dr. Oliver. Patient states he will call Dr. Espinoza's office to get a refill.

## 2020-03-02 NOTE — TELEPHONE ENCOUNTER
----- Message from Sanjuana Dukes sent at 3/2/2020  2:59 PM CST -----  Contact: Self [phone]  Patient Returning Your Phone Call

## 2021-04-12 ENCOUNTER — TELEPHONE (OUTPATIENT)
Dept: INFECTIOUS DISEASES | Facility: CLINIC | Age: 26
End: 2021-04-12

## 2022-07-01 DIAGNOSIS — M48.061 SPINAL STENOSIS OF LUMBAR REGION, UNSPECIFIED WHETHER NEUROGENIC CLAUDICATION PRESENT: Primary | ICD-10-CM

## 2022-07-05 ENCOUNTER — TELEPHONE (OUTPATIENT)
Dept: NEUROSURGERY | Facility: CLINIC | Age: 27
End: 2022-07-05
Payer: COMMERCIAL

## 2022-07-05 NOTE — TELEPHONE ENCOUNTER
----- Message from Kiara Hill NP sent at 6/28/2022  6:43 PM CDT -----  He needs a follow-up with Dr Torres in 2-4 weeks. Dx lumbar stenosis. He needs a referral to PT as well. THanks!

## 2023-06-23 DIAGNOSIS — M25.512 BILATERAL SHOULDER PAIN, UNSPECIFIED CHRONICITY: Primary | ICD-10-CM

## 2023-06-23 DIAGNOSIS — M25.511 BILATERAL SHOULDER PAIN, UNSPECIFIED CHRONICITY: Primary | ICD-10-CM

## 2023-07-25 ENCOUNTER — HOSPITAL ENCOUNTER (OUTPATIENT)
Dept: RADIOLOGY | Facility: HOSPITAL | Age: 28
Discharge: HOME | End: 2023-07-25
Attending: PHYSICIAN ASSISTANT
Payer: COMMERCIAL

## 2023-07-25 ENCOUNTER — OFFICE VISIT (OUTPATIENT)
Dept: PAIN MEDICINE | Facility: CLINIC | Age: 28
End: 2023-07-25
Payer: COMMERCIAL

## 2023-07-25 VITALS
HEART RATE: 68 BPM | BODY MASS INDEX: 39.17 KG/M2 | DIASTOLIC BLOOD PRESSURE: 92 MMHG | HEIGHT: 75 IN | WEIGHT: 315 LBS | SYSTOLIC BLOOD PRESSURE: 140 MMHG

## 2023-07-25 DIAGNOSIS — M54.16 LUMBAR RADICULOPATHY, CHRONIC: ICD-10-CM

## 2023-07-25 DIAGNOSIS — Z98.890 HISTORY OF LUMBAR SURGERY: ICD-10-CM

## 2023-07-25 DIAGNOSIS — M54.9 DORSALGIA, UNSPECIFIED: ICD-10-CM

## 2023-07-25 DIAGNOSIS — M48.07 SPINAL STENOSIS, LUMBOSACRAL REGION: ICD-10-CM

## 2023-07-25 DIAGNOSIS — M48.062 SPINAL STENOSIS OF LUMBAR REGION WITH NEUROGENIC CLAUDICATION: ICD-10-CM

## 2023-07-25 DIAGNOSIS — M48.062 SPINAL STENOSIS OF LUMBAR REGION WITH NEUROGENIC CLAUDICATION: Primary | ICD-10-CM

## 2023-07-25 PROCEDURE — 1160F RVW MEDS BY RX/DR IN RCRD: CPT | Mod: CPTII,S$GLB,, | Performed by: PHYSICIAN ASSISTANT

## 2023-07-25 PROCEDURE — 72114 X-RAY EXAM L-S SPINE BENDING: CPT | Mod: TC,PO

## 2023-07-25 PROCEDURE — 72114 XR LUMBAR SPINE 5 VIEW WITH FLEX AND EXT: ICD-10-PCS | Mod: 26,,, | Performed by: RADIOLOGY

## 2023-07-25 PROCEDURE — 99999 PR PBB SHADOW E&M-EST. PATIENT-LVL V: CPT | Mod: PBBFAC,,, | Performed by: PHYSICIAN ASSISTANT

## 2023-07-25 PROCEDURE — 99999 PR PBB SHADOW E&M-EST. PATIENT-LVL V: ICD-10-PCS | Mod: PBBFAC,,, | Performed by: PHYSICIAN ASSISTANT

## 2023-07-25 PROCEDURE — 3077F PR MOST RECENT SYSTOLIC BLOOD PRESSURE >= 140 MM HG: ICD-10-PCS | Mod: CPTII,S$GLB,, | Performed by: PHYSICIAN ASSISTANT

## 2023-07-25 PROCEDURE — 3008F PR BODY MASS INDEX (BMI) DOCUMENTED: ICD-10-PCS | Mod: CPTII,S$GLB,, | Performed by: PHYSICIAN ASSISTANT

## 2023-07-25 PROCEDURE — 3077F SYST BP >= 140 MM HG: CPT | Mod: CPTII,S$GLB,, | Performed by: PHYSICIAN ASSISTANT

## 2023-07-25 PROCEDURE — 1159F MED LIST DOCD IN RCRD: CPT | Mod: CPTII,S$GLB,, | Performed by: PHYSICIAN ASSISTANT

## 2023-07-25 PROCEDURE — 3008F BODY MASS INDEX DOCD: CPT | Mod: CPTII,S$GLB,, | Performed by: PHYSICIAN ASSISTANT

## 2023-07-25 PROCEDURE — 99204 PR OFFICE/OUTPT VISIT, NEW, LEVL IV, 45-59 MIN: ICD-10-PCS | Mod: S$GLB,,, | Performed by: PHYSICIAN ASSISTANT

## 2023-07-25 PROCEDURE — 1160F PR REVIEW ALL MEDS BY PRESCRIBER/CLIN PHARMACIST DOCUMENTED: ICD-10-PCS | Mod: CPTII,S$GLB,, | Performed by: PHYSICIAN ASSISTANT

## 2023-07-25 PROCEDURE — 99204 OFFICE O/P NEW MOD 45 MIN: CPT | Mod: S$GLB,,, | Performed by: PHYSICIAN ASSISTANT

## 2023-07-25 PROCEDURE — 3080F PR MOST RECENT DIASTOLIC BLOOD PRESSURE >= 90 MM HG: ICD-10-PCS | Mod: CPTII,S$GLB,, | Performed by: PHYSICIAN ASSISTANT

## 2023-07-25 PROCEDURE — 3080F DIAST BP >= 90 MM HG: CPT | Mod: CPTII,S$GLB,, | Performed by: PHYSICIAN ASSISTANT

## 2023-07-25 PROCEDURE — 1159F PR MEDICATION LIST DOCUMENTED IN MEDICAL RECORD: ICD-10-PCS | Mod: CPTII,S$GLB,, | Performed by: PHYSICIAN ASSISTANT

## 2023-07-25 PROCEDURE — 72114 X-RAY EXAM L-S SPINE BENDING: CPT | Mod: 26,,, | Performed by: RADIOLOGY

## 2023-07-25 RX ORDER — NIFEDIPINE 30 MG/1
1 TABLET, EXTENDED RELEASE ORAL DAILY
COMMUNITY
Start: 2023-04-10

## 2023-07-25 RX ORDER — TIZANIDINE 4 MG/1
4 TABLET ORAL NIGHTLY PRN
Qty: 40 TABLET | Refills: 0 | Status: SHIPPED | OUTPATIENT
Start: 2023-07-25

## 2023-07-25 NOTE — PROGRESS NOTES
[facility] New Patient Evaluation      Referred by: Samantha Cleveland    PCP:   Samantha Cleveland NP    CC:   Chief Complaint   Patient presents with    Low-back Pain          HPI:   Rosa Maria Baltazar is a 27 y.o. year old male patient who has a past medical history of Acid reflux, Anxiety and depression, and Hypertension. He presents in referral from Samantha Cleveland for back pain.  He suffered an injury playing college football and underwent lumbar spine surgery in 2017 or 2018.  He underwent PT after surgery the suffered a second injury.  He has chronic lower lumbar back pain.  Pain is felt across the lower back.  With walking (particularly after sitting for a while) he has pain radiate into the right posterior leg and numbness throughout the right leg.  No left sided lower extremity symptoms.  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  P[ain affects sleep quality.  Pain improves with sitting.  He has trid gabpeint, advil and percocet    Denies bowel/ bladder incontinence.    Past and current medications:  Antineuropathics: (gabapentin in the past)  NSAIDs:  advil sparingly as he does not find any benefit with it  Antidepressants:  Muscle relaxers:  Opioids:  (percocet in the past)  Antiplatelets/Anticoagulants:    Physical Therapy/ Chiropractic care:  PT after lumbar surgery - none since then    Pain Intervention History:  none    Past Spine Surgical History:  Lumbar spine surgery in 2017 or 2018; right L4/5, L5/S1        History:    Current Outpatient Medications:     NIFEdipine (PROCARDIA-XL) 30 MG (OSM) 24 hr tablet, Take 1 tablet by mouth once daily., Disp: , Rfl:     pantoprazole (PROTONIX) 40 MG tablet, Take 1 tablet (40 mg total) by mouth once daily., Disp: 90 tablet, Rfl: 1    amLODIPine (NORVASC) 10 MG tablet, Take 1 tablet (10 mg total) by mouth once daily. (Patient not taking: Reported on 7/25/2023), Disp: 5 tablet, Rfl: 0    amLODIPine (NORVASC) 10 MG tablet, Take 1  tablet by mouth once a day. (Patient not taking: Reported on 7/25/2023), Disp: 90 tablet, Rfl: 4    amLODIPine (NORVASC) 10 MG tablet, Take 1 tablet by mouth once a day   NO REFILLS NEED VISIT WITH PROVIDER THIS IS A COURTESY FILL PLEASE INFORM PT THANKS (Patient not taking: Reported on 7/25/2023), Disp: 90 tablet, Rfl: 0    amLODIPine (NORVASC) 10 MG tablet, Take 1 tablet by mouth once daily (Patient not taking: Reported on 7/25/2023), Disp: 90 tablet, Rfl: 0    chlorthalidone (HYGROTEN) 25 MG Tab, Take 1 tablet (25 mg total) by mouth once daily. (Patient not taking: Reported on 7/25/2023), Disp: 90 tablet, Rfl: 0    clindamycin (CLEOCIN) 300 MG capsule, TAKE 1 CAPSULE BY MOUTH THREE TIMES DAILY FOR 7 DAYS (Patient not taking: Reported on 7/25/2023), Disp: 21 capsule, Rfl: 0    clotrimazole-betamethasone 1-0.05% (LOTRISONE) cream, APPLY TO THE AFFECTED AND SURROUNDING AREAS OF SKIN BY TOPICAL ROUTE 2 TIMES PER DAY IN THE MORNING AND EVENING FOR 2 WEEKS (Patient not taking: Reported on 7/25/2023), Disp: 15 g, Rfl: 0    dicyclomine (BENTYL) 20 mg tablet, Take 1 tablet (20 mg total) by mouth 3 (three) times daily as needed. (Patient not taking: Reported on 7/25/2023), Disp: 60 tablet, Rfl: 1    docusate sodium (COLACE) 100 MG capsule, Take 1 capsule (100 mg total) by mouth once daily. (Patient not taking: Reported on 7/25/2023), Disp: , Rfl: 0    famotidine (PEPCID) 20 MG tablet, Take 1 tablet (20 mg total) by mouth 2 (two) times daily. (Patient not taking: Reported on 7/25/2023), Disp: 20 tablet, Rfl: 0    gabapentin (NEURONTIN) 300 MG capsule, Take 1 capsule (300 mg total) by mouth 3 (three) times daily., Disp: 90 capsule, Rfl: 11    ibuprofen (ADVIL,MOTRIN) 200 MG tablet, Take 1 tablet (200 mg total) by mouth every 6 (six) hours as needed for Pain. (Patient not taking: Reported on 7/25/2023), Disp: , Rfl: 0    mupirocin (BACTROBAN) 2 % ointment, Apply topically 2 (two) times daily. (Patient not taking: Reported on  "7/25/2023), Disp: 1 Tube, Rfl: 0    mupirocin (BACTROBAN) 2 % ointment, APPLY TO THE AFFECTED AREA THREE TIMES DAILY AS DIRECTED (Patient not taking: Reported on 7/25/2023), Disp: 22 g, Rfl: 1    oxyCODONE-acetaminophen (PERCOCET)  mg per tablet, Take 1 tablet by mouth every 4 (four) hours as needed. Do not drive, drink alcohol, or take any other sedating medications whilst taking. (Patient not taking: Reported on 7/25/2023), Disp: 20 tablet, Rfl: 0    polyethylene glycol (GLYCOLAX) 17 gram PwPk, Take 17 g by mouth daily as needed (constipation). (Patient not taking: Reported on 7/25/2023), Disp: 14 each, Rfl: 0    tiZANidine (ZANAFLEX) 4 MG tablet, Take 1 tablet (4 mg total) by mouth nightly as needed (muscle spasms/ pain)., Disp: 40 tablet, Rfl: 0    Past Medical History:   Diagnosis Date    Acid reflux     Anxiety and depression     Hypertension        Past Surgical History:   Procedure Laterality Date    BACK SURGERY      "shave disc"    COLONOSCOPY N/A 11/9/2018    Procedure: COLONOSCOPY Clenpiq;  Surgeon: Hortencia Oliver MD;  Location: [facility];  Service: Endoscopy;  Laterality: N/A;    KNEE ARTHROSCOPY      KNEE ARTHROSCOPY W/ ACL RECONSTRUCTION Right     mcl repair    TONSILLECTOMY         Family History   Problem Relation Age of Onset    Prostate cancer Neg Hx     Kidney disease Neg Hx        Social History     Socioeconomic History    Marital status:    Tobacco Use    Smoking status: Some Days     Types: Vaping with nicotine    Smokeless tobacco: Former     Types: Chew   Substance and Sexual Activity    Alcohol use: Yes     Comment: socially    Drug use: Yes     Types: Marijuana     Comment: last smoked 2 weeks ago    Sexual activity: Yes       Review of patient's allergies indicates:   Allergen Reactions    Shellfish containing products Hives       Labs:  No results found for: LABA1C, HGBA1C    Lab Results   Component Value Date    WBC 13.94 (H) 09/19/2022    HGB 16.8 09/19/2022    HCT " "49.7 09/19/2022    MCV 80 (L) 09/19/2022     09/19/2022           Review of Systems:  Low back pain.  Right leg pain and numbness..  Balance of review of systems is negative.    Physical Exam:  Vitals:    07/25/23 1053   BP: (!) 140/92   Pulse: 68   Weight: (!) 170.2 kg (375 lb 3.6 oz)   Height: 6' 3" (1.905 m)   PainSc:   8   PainLoc: Back     Body mass index is 46.9 kg/m².    Gen: NAD  Psych: mood appropriate for given condition  HEENT: eyes anicteric   CV: RRR, 2+ radial pulse  HEENT: anicteric   Respiratory: non-labored, no signs of respiratory distress  Abd: non-distended  Skin: warm, dry and intact.  Gait: Able to heel walk, toe walk. No antalgic gait.     Coordination:   Tandem walking coordination: normal    Cervical spine: ROM is full in flexion, extension and lateral rotation without increased pain.  Spurling's maneuver causes no neck pain to either side.  Myofascial exam: No Tenderness to palpation across cervical paraspinous region bilaterally.    Lumbar spine:  Lumbar spine: ROM is full with flexion extension and oblique extension with no increased pain.    Walker's test causes no increased pain on either side.    Supine straight leg raise is negative bilaterally.    Internal and external rotation of the hip causes no increased pain on either side.  Myofascial exam: No tenderness to palpation across lumbar paraspinous muscles. No tenderness to palpation over the bilateral greater trochanters and bilateral SI joint    Sensory:  Intact and symmetrical to light touch in C4-T1 dermatomes bilaterally. Intact and symmetrical to light touch in L1-S1 dermatomes bilaterally.    Motor:    Right Left   C4 Shoulder Abduction  5  5   C5 Elbow Flexion    5  5   C6 Wrist Extension  5  5   C7 Elbow Extension   5  5   C8/T1 Hand Intrinsics   5  5        Right Left   L2/3 Iliacus Hip flexion  5  5   L3/4 Qudratus Femoris Knee Extension  5  5   L4/5 Tib Anterior Ankle Dorsiflexion   5  5   L5/S1 Extensor Hallicus " Longus Great toe extension  5  5   S1/S2 Gastroc/Soleus Plantar Flexion  5  5      Right Left   Triceps DTR 2+ 2+   Biceps DTR 2+ 2+   Brachioradialis DTR 2+ 2+   Patellar DTR 2+ 2+   Achilles DTR 2+ 2+   Enriquez Absent  Absent   Clonus Absent Absent   Babinski Absent Absent       Imaging:  MRI lumbar spine 6-28-22:  congenital stenosis.  L2/3 central canal narrowing.  L3/4 facet hypertrophy, broad based disk with moderate central canal narrowing.  L4/5 and L5/S1 right laminectomy defect.      Assessment:   Rosa Maria Baltazar is a 27 y.o. year old male patient who has a past medical history of Acid reflux, Anxiety and depression, and Hypertension. He presents in referral from Samantha Cleveland for hhistory of lumbar surgery with continued lower back and right leg pain, neurogenic claudication in setting of known lumbar stenosis.   He would like to know where he currently stands regarding his spine and prognosis to address the pain and improve quality of life.    Problem List Items Addressed This Visit    None  Visit Diagnoses       Spinal stenosis of lumbar region with neurogenic claudication    -  Primary    Relevant Medications    tiZANidine (ZANAFLEX) 4 MG tablet    Other Relevant Orders    X-Ray Lumbar Complete Including Flex And Ext    MRI Lumbar Spine Without Contrast    Dorsalgia, unspecified        Lumbar radiculopathy, chronic        Relevant Medications    tiZANidine (ZANAFLEX) 4 MG tablet    Other Relevant Orders    X-Ray Lumbar Complete Including Flex And Ext    MRI Lumbar Spine Without Contrast    History of lumbar surgery        Relevant Medications    tiZANidine (ZANAFLEX) 4 MG tablet    Other Relevant Orders    X-Ray Lumbar Complete Including Flex And Ext    MRI Lumbar Spine Without Contrast    Spinal stenosis, lumbosacral region        Relevant Medications    tiZANidine (ZANAFLEX) 4 MG tablet    Other Relevant Orders    MRI Lumbar Spine Without Contrast            Plan:   - further assessment with xrays  and MRI lumbar spine to determine current treatment plan.  He has had prior surgery and known spinal stenosis  at a young age of 27.  - try zanaflex at night to help with pain and spasms  - follow up after imaging.       Follow Up: RTC after imaging.     : Reviewed and consistent with medication use as prescribed.    Thank you for referring this interesting patient, and I look forward to continuing to collaborate in his care.        Kiara Nagy PA-C  [facility]

## 2023-07-26 ENCOUNTER — TELEPHONE (OUTPATIENT)
Dept: PAIN MEDICINE | Facility: CLINIC | Age: 28
End: 2023-07-26
Payer: COMMERCIAL

## 2023-07-26 NOTE — TELEPHONE ENCOUNTER
----- Message from Linnea Bravo sent at 7/26/2023  8:57 AM CDT -----  Regarding: advice  Contact: Patient  Type: Needs Medical Advice  Who Called:  Debbie with [employer]   Symptoms (please be specific):    How long has patient had these symptoms:    Pharmacy name and phone #:    Best Call Back Number: [phone]  Additional Information: Debbie stated that she was calling concerning patient clinic notes to be sent over. FAX [phone]. Please contact if any further questions. Thanks!

## 2023-07-28 DIAGNOSIS — M25.511 BILATERAL SHOULDER PAIN, UNSPECIFIED CHRONICITY: Primary | ICD-10-CM

## 2023-07-28 DIAGNOSIS — M25.512 BILATERAL SHOULDER PAIN, UNSPECIFIED CHRONICITY: Primary | ICD-10-CM

## 2023-08-09 ENCOUNTER — TELEPHONE (OUTPATIENT)
Dept: PAIN MEDICINE | Facility: CLINIC | Age: 28
End: 2023-08-09
Payer: COMMERCIAL

## 2023-08-09 DIAGNOSIS — Z98.890 HISTORY OF LUMBAR SURGERY: ICD-10-CM

## 2023-08-09 DIAGNOSIS — M48.062 SPINAL STENOSIS OF LUMBAR REGION WITH NEUROGENIC CLAUDICATION: ICD-10-CM

## 2023-08-09 DIAGNOSIS — M54.16 LUMBAR RADICULOPATHY, CHRONIC: Primary | ICD-10-CM

## 2023-08-09 RX ORDER — MELOXICAM 15 MG/1
15 TABLET ORAL DAILY
Qty: 30 TABLET | Refills: 0 | Status: SHIPPED | OUTPATIENT
Start: 2023-08-09

## 2023-08-09 NOTE — TELEPHONE ENCOUNTER
He should take zanaflex that was prescribed if not taking.  I have also sent in a prescription for mobic to take.  Sent to [facility] pharmacy.

## 2023-08-10 NOTE — TELEPHONE ENCOUNTER
I spoke with Mr. Baltazar and let him know Kiara Nagy sent Derik to his pharmacy. Also advised him to take the Zanaflex as per Kiara, he said he was taking it at night but it was making him to tired during the day. I told him to try half a tablet and let Kiara know how that worked for him, he indicated understanding. He said he can not afford to have the MRI done at this time because he would have to pay $600.00 out of pocket. When he is able to he will have the MRI done and then follow up to find out what the next step is in his treatment.

## 2023-08-16 ENCOUNTER — PATIENT MESSAGE (OUTPATIENT)
Dept: PAIN MEDICINE | Facility: CLINIC | Age: 28
End: 2023-08-16
Payer: COMMERCIAL

## 2023-08-16 DIAGNOSIS — Z98.890 HISTORY OF LUMBAR SURGERY: ICD-10-CM

## 2023-08-16 DIAGNOSIS — M48.07 SPINAL STENOSIS, LUMBOSACRAL REGION: ICD-10-CM

## 2023-08-16 DIAGNOSIS — M54.16 LUMBAR RADICULOPATHY, CHRONIC: Primary | ICD-10-CM

## 2023-08-16 RX ORDER — METHYLPREDNISOLONE 4 MG/1
TABLET ORAL
Qty: 21 TABLET | Refills: 0 | Status: SHIPPED | OUTPATIENT
Start: 2023-08-16 | End: 2023-09-06

## 2023-08-23 ENCOUNTER — PATIENT MESSAGE (OUTPATIENT)
Dept: PAIN MEDICINE | Facility: CLINIC | Age: 28
End: 2023-08-23
Payer: COMMERCIAL

## 2024-09-21 NOTE — PROGRESS NOTES
Subjective:       Patient ID: Rosa Maria Baltazar is a 24 y.o. male.    Chief Complaint: Joint Swelling (started on tuesday )     I have reviewed the PMH,  and  for this patient    He  has a past medical history of Acid reflux, Anxiety and depression, and Hypertension.  He is a patient who usually sees Dr. ABRAHAM. This is a new patient to me. He presents today for evaluation of swelling in his right elbow.  He reports that the symptoms began Tuesday.  He works for the [employer] and they had a severe accident that went into the swab on Tuesday.  He said he was up to his chest and swallow water.  He has noticed increasing swelling and pain in his right elbow since then.  He has not had fever.  He does have some stiffness in the elbow.  He reports that his pain is 8 or 9/10.  His blood pressure was elevated today at 152/94.  He reports that he has not taken his medications today.The patient denies chest pain, shortness of breath, nausea, or dizziness.     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    Hypertension, essential 11/15/2019     Resolved Ambulatory Problems     Diagnosis Date Noted    No Resolved Ambulatory Problems     Past Medical History:   Diagnosis Date    Acid reflux     Anxiety and depression     Hypertension          MEDICATIONS:  Current Outpatient Medications:     amLODIPine (NORVASC) 10 MG tablet, Take 1 tablet (10 mg total) by mouth once daily., Disp: 90 tablet, Rfl: 0    chlorthalidone (HYGROTEN) 25 MG Tab, Take 1 tablet (25 mg total) by mouth once daily., Disp: 90 tablet, Rfl: 0    dicyclomine (BENTYL) 20 mg tablet, Take 1 tablet (20 mg total) by mouth 3 (three) times daily as needed., Disp: 60 tablet, Rfl: 2    losartan (COZAAR) 50 MG tablet, Take 1 tablet(s) by mouth once daily, Disp: 90 tablet, Rfl: 0    pantoprazole (PROTONIX)  40 MG tablet, Take 1 tablet(s) every day by oral route., Disp: 90 tablet, Rfl: 0    doxycycline (VIBRA-TABS) 100 MG tablet, Take 1 tablet (100 mg total) by mouth 2 (two) times daily for 10 days., Disp: 20 tablet, Rfl: 0  No current facility-administered medications for this visit.       HEALTH MAINTENANCE:   Health Maintenance   Topic Date Due    Lipid Panel  1994    TETANUS VACCINE  08/31/2012    Pneumococcal Vaccine (Medium Risk) (1 of 1 - PPSV23) 08/31/2013       Review of Systems   Constitutional: Negative for chills, fatigue and fever.   HENT: Negative for congestion, ear discharge, ear pain, rhinorrhea and sore throat.    Eyes: Negative for discharge, redness and itching.   Respiratory: Negative for cough, chest tightness, shortness of breath and wheezing.    Cardiovascular: Negative for chest pain, palpitations and leg swelling.   Gastrointestinal: Negative for abdominal pain, constipation, diarrhea, nausea and vomiting.   Endocrine: Negative for cold intolerance and heat intolerance.   Genitourinary: Negative for dysuria, flank pain, frequency and hematuria.   Musculoskeletal: Negative for arthralgias, back pain, joint swelling and myalgias.   Skin: Negative for color change and rash.   Neurological: Negative for dizziness, tremors, numbness and headaches.   Psychiatric/Behavioral: Negative for dysphoric mood and sleep disturbance. The patient is not nervous/anxious.        Objective:          A1C:      CBC:  Recent Labs   Lab 08/28/17  1610 10/19/17  1402 07/03/18  1504 11/08/18  1113   WBC 11.06 8.49 14.27 H 13.99 H   RBC 6.29 H 6.41 H 6.16 6.13   Hemoglobin 17.1 17.6 16.8 16.6   Hematocrit 50.4 51.2 49.8 48.4   Platelets 296 282 277 327   Mean Corpuscular Volume 80 L 80 L 81 L 79 L   Mean Corpuscular Hemoglobin 27.2 27.5 27.3 27.1   Mean Corpuscular Hemoglobin Conc 33.9 34.4 33.7 34.3     CMP:  Recent Labs   Lab 08/28/17  1610  07/03/18  1504   Glucose 81   < > 96   Calcium 10.0   < > 10.1    Albumin 4.6  --  4.5   Total Protein 7.7  --  7.9   Sodium 142   < > 136   Potassium 4.3   < > 3.6   CO2 24   < > 25   Chloride 106   < > 102   BUN, Bld 16   < > 15   Creatinine 0.86   < > 0.8   Alkaline Phosphatase 71  --  78   ALT 84 H  --  59 H   AST 33  --  33   Total Bilirubin 0.5  --  1.0    < > = values in this interval not displayed.     LIPIDS:      TSH:            Physical Exam   Constitutional: He appears well-developed and well-nourished. He does not have a sickly appearance.   HENT:   Head: Normocephalic and atraumatic.   Right Ear: External ear normal. Tympanic membrane is not erythematous. No middle ear effusion.   Left Ear: External ear normal. Tympanic membrane is not erythematous.  No middle ear effusion.   Nose: No rhinorrhea. Right sinus exhibits no maxillary sinus tenderness and no frontal sinus tenderness. Left sinus exhibits no maxillary sinus tenderness and no frontal sinus tenderness.   Mouth/Throat: No posterior oropharyngeal edema or posterior oropharyngeal erythema. No tonsillar exudate.   Eyes: Pupils are equal, round, and reactive to light. Conjunctivae and EOM are normal. Right eye exhibits no discharge. Left eye exhibits no discharge. Right conjunctiva is not injected. Left conjunctiva is not injected.   Neck: No thyromegaly present.   Cardiovascular: Normal rate, regular rhythm, normal heart sounds and intact distal pulses.   No murmur heard.  Pulmonary/Chest: Effort normal and breath sounds normal. He has no wheezes. He has no rhonchi. He has no rales.   Abdominal: Bowel sounds are normal. He exhibits no distension. There is no tenderness.   Musculoskeletal: He exhibits no edema.        Right forearm: He exhibits tenderness and swelling.   He has a central whelp near the elbow and surrounding erythema and warmth   Lymphadenopathy:     He has no cervical adenopathy.   Neurological: He displays normal reflexes. No cranial nerve deficit.   Skin: Skin is warm and dry. No lesion and no  rash noted.   Psychiatric: He has a normal mood and affect. His behavior is normal. His mood appears not anxious. His speech is not rapid and/or pressured. He is not agitated and not aggressive. He does not exhibit a depressed mood.             Assessment and Plan:     Problem List Items Addressed This Visit        Cardiac/Vascular    Hypertension, essential -he has not been taking his blood pressure medicines.  We cleaned up his med list because he had duplicates of all of his medicines on it.  Please take medications as directed.  Follow up with PCP.      Other Visit Diagnoses     Right elbow pain    -  Primary - this looks like a spider bite.  We will treat appropriately.      Cellulitis of right upper extremity    - will treat with doxycycline.      Spider bite wound, accidental or unintentional, initial encounter    - we will treat with a steroid shot to decrease inflammation and allergic reaction.  Side effects were discussed.          Orders Placed This Encounter    triamcinolone acetonide injection 40 mg    doxycycline (VIBRA-TABS) 100 MG tablet       I have excused him from work today.   Follow-up with pcp in 2 weeks.       Noelle Sena MD,  FACP  Internal Medicine     4 = No assist / stand by assistance